# Patient Record
Sex: FEMALE | Race: OTHER | HISPANIC OR LATINO | URBAN - METROPOLITAN AREA
[De-identification: names, ages, dates, MRNs, and addresses within clinical notes are randomized per-mention and may not be internally consistent; named-entity substitution may affect disease eponyms.]

---

## 2021-09-29 ENCOUNTER — EMERGENCY (EMERGENCY)
Facility: HOSPITAL | Age: 36
LOS: 0 days | Discharge: HOME | End: 2021-09-29
Attending: STUDENT IN AN ORGANIZED HEALTH CARE EDUCATION/TRAINING PROGRAM | Admitting: STUDENT IN AN ORGANIZED HEALTH CARE EDUCATION/TRAINING PROGRAM
Payer: MEDICAID

## 2021-09-29 VITALS
SYSTOLIC BLOOD PRESSURE: 125 MMHG | HEART RATE: 60 BPM | OXYGEN SATURATION: 100 % | WEIGHT: 138.89 LBS | RESPIRATION RATE: 18 BRPM | TEMPERATURE: 97 F | DIASTOLIC BLOOD PRESSURE: 76 MMHG

## 2021-09-29 DIAGNOSIS — R21 RASH AND OTHER NONSPECIFIC SKIN ERUPTION: ICD-10-CM

## 2021-09-29 DIAGNOSIS — R22.0 LOCALIZED SWELLING, MASS AND LUMP, HEAD: ICD-10-CM

## 2021-09-29 PROCEDURE — 99283 EMERGENCY DEPT VISIT LOW MDM: CPT

## 2021-09-29 RX ORDER — KETOCONAZOLE/HYDROCORTISONE 2 %-1 %
1 COMBINATION PACKAGE, CREAM AND GEL TOPICAL
Qty: 1 | Refills: 0
Start: 2021-09-29 | End: 2021-10-05

## 2021-09-29 RX ORDER — KETOCONAZOLE 20 MG/G
1 AEROSOL, FOAM TOPICAL
Qty: 1 | Refills: 0
Start: 2021-09-29

## 2021-09-29 RX ORDER — HYDROCORTISONE 1 %
1 OINTMENT (GRAM) TOPICAL
Qty: 1 | Refills: 0
Start: 2021-09-29 | End: 2021-10-08

## 2021-09-29 NOTE — ED PROVIDER NOTE - CARE PROVIDER_API CALL
Jack Barney  DERMATOLOGY  [address] [place] [address], [address] [place] [address]  Phone: [phone]  Fax: [phone]  Follow Up Time: 1-3 Days

## 2021-09-29 NOTE — ED POST DISCHARGE NOTE - RESULT SUMMARY
PHARMACY CALLED: HYDROCORTISONE CREAM 2.5 % AND KETOCONAZOLE CREAM 2.0 HAVE TO BE RE-SENT AND WRITTEN AS SEPARATE ORDER. WE WROTE IN SAME ORDER, AND IT CANNOT BE DISPENSED THIS. ORDERS RE-WRIITTEN.

## 2021-09-29 NOTE — ED PROVIDER NOTE - ATTENDING CONTRIBUTION TO CARE
35 yo f no pmh  pt states she has had a rash for the past week. rash started on face then to legs/feet. rash pruritic. no fevers/chills/streaking redness. pt saw pcp and was started on prednisone which cleared up rash to face. 37 yo f no pmh  pt states she has had a rash for the past week. rash started on face then to legs/feet. face rash described more as mild swelling after applying make up. no clear trigger to LE rash. no hiking/camping/bug bites. rash pruritic, flat, macular, blanching. no fevers/chills/streaking redness. no st/difficulty breathing/swallowing. pt saw pcp and was started on prednisone which cleared up rash to face. no trunk/back/arm involvement. pt presents for persistent leg sx    vss  gen- NAD, aaox3  HENT- normal face, no erythema/edema, lip/tongue swelling  card-rrr  lungs-ctab, no wheezing or rhonchi  abd-sntnd, no guarding or rebound  neuro- full str/sensation, cn ii-xii grossly intact, normal coordination and gait  UE- no rash to extremities/palms  LE- b/l hyperpigmented areas, b/l thighs w/ mild macular, flat ~2 inch circular rash, no surrounding erythema/streaking, no discharge, no rash to soles, no vesicles/bulla. rash blanching, no petichiae    rash more c/w contact vs systemic allergic reaction, no e/o anephylaxis  will rx low potency steroid cream, antifungal cream, derm f/u

## 2021-09-29 NOTE — ED PROVIDER NOTE - NS ED ROS FT
Constitutional: (-) fever  Eyes/ENT: (-) visual changes   Cardiovascular: (-) chest pain, (-) syncope  Respiratory: (-) cough, (-) shortness of breath  Gastrointestinal: (-) vomiting, (-) diarrhea  Genitourinary: (-) dysuria, (-) hesitancy, (-) frequency   Musculoskeletal: (-) neck pain, (-) back pain, (-) joint pain  Integumentary: (+) rash, (-) edema  Neurological: (-) headache, (-) altered mental status  Allergic/Immunologic: (-) pruritus

## 2021-09-29 NOTE — ED PROVIDER NOTE - CLINICAL SUMMARY MEDICAL DECISION MAKING FREE TEXT BOX
rash more c/w contact vs systemic allergic reaction, no e/o anephylaxis  will rx low potency steroid cream, antifungal cream, derm f/u

## 2021-09-29 NOTE — ED ADULT TRIAGE NOTE - PRO INTERPRETER NEED 2
----- Message from Ale Balderas sent at 5/11/2020 10:44 AM CDT -----  Contact: pt  Name of Who is Calling: Nicole Jacques    What is the request in detail: pt would like to schedule her annual. Please contact to further discuss and advise.    [facility] [phone]     [place]

## 2021-09-29 NOTE — ED PROVIDER NOTE - PHYSICAL EXAMINATION
Gen: NAD, AOx3  Head: NCAT  HEENT: PERRL, oral mucosa moist, normal conjunctiva, oropharynx clear without exudate or erythema  Lung: CTAB, no respiratory distress, no wheezing, rales, rhonchi  CV: normal s1/s2, rrr, Normal perfusion, pulses 2+ throughout  Abd: soft, NTND, no CVA tenderness  MSK: No edema, no visible deformities, full range of motion in all 4 extremities  Neuro: CN II-XII grossly intact, No focal neurologic deficits  Skin: BLE: hyperpigmented blanchable circular areas throughout legs w/ no surrounding erythema/streaking/discharge, no rash to soles, no vesicles/bulla. no petechiae  Psych: normal affect

## 2021-09-29 NOTE — ED PROVIDER NOTE - PATIENT PORTAL LINK FT
You can access the FollowMyHealth Patient Portal offered by [facility] by registering at the following website: [email or web address]. By joining [facility]’s FollowMyHealth portal, you will also be able to view your health information using other applications (apps) compatible with our system.

## 2021-09-29 NOTE — ED PROVIDER NOTE - OBJECTIVE STATEMENT
35 yo 37 yo female with no pertinent pmh presents c/o pruritic rash for one week. pt states the pain started on her face and now on her legs. pt states to have been seen by her pcp that started her on a course of prednisone that resolved the rash to her face but is still present on her legs. pt states to note the rash after using make-up. pt denies any other symptoms including fevers, chill, headache, recent illness/travel, cough, abdominal pain, chest pain, or SOB.